# Patient Record
Sex: FEMALE | Race: WHITE | NOT HISPANIC OR LATINO | URBAN - METROPOLITAN AREA
[De-identification: names, ages, dates, MRNs, and addresses within clinical notes are randomized per-mention and may not be internally consistent; named-entity substitution may affect disease eponyms.]

---

## 2021-06-03 ENCOUNTER — EMERGENCY (EMERGENCY)
Facility: HOSPITAL | Age: 71
LOS: 1 days | Discharge: HOME | End: 2021-06-03
Attending: EMERGENCY MEDICINE | Admitting: EMERGENCY MEDICINE
Payer: SELF-PAY

## 2021-06-03 VITALS
WEIGHT: 160.06 LBS | TEMPERATURE: 99 F | HEIGHT: 67 IN | OXYGEN SATURATION: 97 % | RESPIRATION RATE: 16 BRPM | HEART RATE: 97 BPM | DIASTOLIC BLOOD PRESSURE: 87 MMHG | SYSTOLIC BLOOD PRESSURE: 141 MMHG

## 2021-06-03 DIAGNOSIS — H57.12 OCULAR PAIN, LEFT EYE: ICD-10-CM
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
## 2021-06-03 DIAGNOSIS — S05.02XA INJURY OF CONJUNCTIVA AND CORNEAL ABRASION WITHOUT FOREIGN BODY, LEFT EYE, INITIAL ENCOUNTER: ICD-10-CM
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
## 2021-06-03 PROCEDURE — 99283 EMERGENCY DEPT VISIT LOW MDM: CPT

## 2021-06-03 RX ORDER — ERYTHROMYCIN BASE 5 MG/GRAM
1 OINTMENT (GRAM) OPHTHALMIC (EYE) ONCE
Refills: 0 | Status: COMPLETED | OUTPATIENT
Start: 2021-06-03 | End: 2021-06-03

## 2021-06-03 RX ORDER — CIPROFLOXACIN HCL 0.3 %
1 DROPS OPHTHALMIC (EYE) ONCE
Refills: 0 | Status: COMPLETED | OUTPATIENT
Start: 2021-06-03 | End: 2021-06-03

## 2021-06-03 RX ADMIN — Medication 1 APPLICATION(S): at 15:42

## 2021-06-03 RX ADMIN — Medication 1 DROP(S): at 15:46

## 2021-06-03 NOTE — ED ADULT TRIAGE NOTE - CHIEF COMPLAINT QUOTE
"I think I'm losing my eye." Pt c/o left eye burning 1 hr s/p receiving the covid vaccine yesterday. left eye redness noted, denies vision change

## 2021-06-03 NOTE — ED PROVIDER NOTE - PATIENT PORTAL LINK FT
You can access the FollowMyHealth Patient Portal offered by [facility] by registering at the following website: [email or web address]. By joining [employer]’s FollowMyHealth portal, you will also be able to view your health information using other applications (apps) compatible with our system.

## 2021-06-03 NOTE — ED PROVIDER NOTE - PHYSICAL EXAMINATION
Physical Exam  GEN: Awake, alert, non-toxic appearing, NCAT  EYES: full EOMI, left pupil does not appear dilated (~3-4mm), perrl, globe does not feel hard w/ palpation, IOP 18-20 bl, pt able to perceive some light w/ L eye, fluorescein uptake to L eye  ENT: External inspection normal, normal voice,   HEAD: atraumatic  NECK: FROM neck, supple,   RESP: no tachypnea, no hypoxia, no resp distress,  SKIN: no periorbital swelling/erythema, no rash to face

## 2021-06-03 NOTE — ED PROVIDER NOTE - CARE PROVIDER_API CALL
Bryanna Adams)  Ophthalmology  [address] [place] [address]  Phone: [phone]  Fax: [phone]  Follow Up Time:

## 2021-06-03 NOTE — ED PROVIDER NOTE - OBJECTIVE STATEMENT
71 yof pw left eye pain.  reports feels like "something is in the eye".  began yesterday, 1 hr after covid vaccine.  pt normally wears goggles, took it off very quickly yesterday and noted the edge of the goggle brushed against her left eye, subsequently rubbed her eye.  hx of retina detachment of L eye 8 years ago, baseline no vision, able to see "little light".  does not use contact.

## 2021-06-03 NOTE — ED PROVIDER NOTE - CLINICAL SUMMARY MEDICAL DECISION MAKING FREE TEXT BOX
trauma to eye, no globe rupture, +fluorescein uptake, IOP equal bl wnl, no herpetic rash, will start ophthalmic ointment/gtt, ophtho f/u, strict return precautions discussed

## 2021-06-03 NOTE — ED PROVIDER NOTE - NSFOLLOWUPINSTRUCTIONS_ED_ALL_ED_FT
Follow up with your primary care doctor or clinics listed below if you do not have a doctor  Put 2 drops into affected eye every 15 minutes for the first 6 hours, then 2 drops into the affected eye every 30 minutes for the remainder of the first day. On day 2, instill 2 drops into the affected eye hourly. On days 3-14, instill 2 drops into affected eye every 4 hours   If you prefer to use ointment, use the erythromycin ointment 4 times a day  Follow up with [facility] TOMORROW  [facility]  Our Walk-[facility] Clinic is open between the hours of 8:00am and 3:30pm, Monday through Friday.  Eye Clinic  Tel: [phone]  Address  [address] [place] [address], [address] [place] [address]  Return immediately for any new or worsening symptoms or any new concerns   Return immediately for any new or worsening symptoms or any new concerns

## 2021-06-05 RX ORDER — ERYTHROMYCIN BASE 5 MG/GRAM
1 OINTMENT (GRAM) OPHTHALMIC (EYE)
Qty: 28 | Refills: 0
Start: 2021-06-05 | End: 2021-06-11

## 2021-06-05 RX ORDER — CIPROFLOXACIN HCL 0.3 %
2 DROPS OPHTHALMIC (EYE)
Qty: 1 | Refills: 0
Start: 2021-06-05 | End: 2021-06-14
